# Patient Record
Sex: MALE | Race: OTHER | HISPANIC OR LATINO | URBAN - METROPOLITAN AREA
[De-identification: names, ages, dates, MRNs, and addresses within clinical notes are randomized per-mention and may not be internally consistent; named-entity substitution may affect disease eponyms.]

---

## 2017-05-02 ENCOUNTER — EMERGENCY (EMERGENCY)
Facility: HOSPITAL | Age: 27
LOS: 1 days | Discharge: HOME | End: 2017-05-02
Attending: EMERGENCY MEDICINE | Admitting: EMERGENCY MEDICINE
Payer: COMMERCIAL

## 2017-05-02 VITALS
HEART RATE: 81 BPM | OXYGEN SATURATION: 97 % | TEMPERATURE: 99 F | SYSTOLIC BLOOD PRESSURE: 131 MMHG | DIASTOLIC BLOOD PRESSURE: 83 MMHG

## 2017-05-02 DIAGNOSIS — Z88.0 ALLERGY STATUS TO PENICILLIN: ICD-10-CM

## 2017-05-02 DIAGNOSIS — R09.81 NASAL CONGESTION: ICD-10-CM

## 2017-05-02 DIAGNOSIS — E78.00 PURE HYPERCHOLESTEROLEMIA, UNSPECIFIED: ICD-10-CM

## 2017-05-02 DIAGNOSIS — J02.9 ACUTE PHARYNGITIS, UNSPECIFIED: ICD-10-CM

## 2017-05-02 LAB — S PYO AG SPEC QL IA: NEGATIVE — SIGNIFICANT CHANGE UP

## 2017-05-02 PROCEDURE — 99283 EMERGENCY DEPT VISIT LOW MDM: CPT

## 2017-05-02 RX ORDER — CETIRIZINE HYDROCHLORIDE, PSEUDOEPHEDRINE HYDROCHLORIDE 5; 120 MG/1; MG/1
1 TABLET, FILM COATED, EXTENDED RELEASE ORAL
Qty: 20 | Refills: 0 | OUTPATIENT
Start: 2017-05-02 | End: 2017-05-12

## 2017-05-02 RX ORDER — GLYCERIN 1 %
2 DROPS OPHTHALMIC (EYE)
Qty: 1 | Refills: 0 | OUTPATIENT
Start: 2017-05-02 | End: 2017-05-09

## 2017-05-02 RX ORDER — IBUPROFEN 200 MG
600 TABLET ORAL ONCE
Qty: 0 | Refills: 0 | Status: COMPLETED | OUTPATIENT
Start: 2017-05-02 | End: 2017-05-02

## 2017-05-02 RX ORDER — IBUPROFEN 200 MG
1 TABLET ORAL
Qty: 20 | Refills: 0 | OUTPATIENT
Start: 2017-05-02 | End: 2017-05-07

## 2017-05-02 RX ORDER — DEXAMETHASONE 0.5 MG/5ML
10 ELIXIR ORAL ONCE
Qty: 0 | Refills: 0 | Status: COMPLETED | OUTPATIENT
Start: 2017-05-02 | End: 2017-05-02

## 2017-05-02 RX ADMIN — Medication 10 MILLIGRAM(S): at 16:36

## 2017-05-02 RX ADMIN — Medication 600 MILLIGRAM(S): at 16:36

## 2017-05-02 NOTE — ED ADULT NURSE NOTE - CHPI ED SYMPTOMS NEG
no nausea/no vomiting/no numbness/no change in level of consciousness/no blurred vision/no loss of consciousness/no syncope/no weakness/no fever/no chills

## 2017-05-02 NOTE — ED PROVIDER NOTE - OBJECTIVE STATEMENT
25 y/o male pt, no hx of medical problems. with one week of throat itch and discomfort and nasal congestion, runny nose, dry cough. Denies trouble swalliwing, chest pain, abd pain, nausea, vomiting, HA, neck pain.

## 2017-05-02 NOTE — ED ADULT NURSE NOTE - OBJECTIVE STATEMENT
Patient is a 25 y/o male presenting to the ED with sore throat x 2-3 weeks and sudden onset of bilateral red eyes since yesterday. Patient reports constant 7/10 throat pain, non-productive cough. Patient denies fever/chills, HA, SOB, sick contacts, change in vision/blurry vision, N/V/D. Eyes are bilaterally red, denies use of contact lenses/glasses/trauma/foreign object.

## 2017-05-02 NOTE — ED PROVIDER NOTE - MEDICAL DECISION MAKING DETAILS
symptoms consistent w viral URI, negative strep, most likely also exacerbated by allergies. no need for abxcs, recommend motrin for throat discomfort, decongestant/antihistamines.